# Patient Record
Sex: FEMALE | Race: WHITE | NOT HISPANIC OR LATINO | URBAN - METROPOLITAN AREA
[De-identification: names, ages, dates, MRNs, and addresses within clinical notes are randomized per-mention and may not be internally consistent; named-entity substitution may affect disease eponyms.]

---

## 2020-06-03 ENCOUNTER — WEB ENCOUNTER (OUTPATIENT)
Dept: URBAN - METROPOLITAN AREA CLINIC 118 | Facility: CLINIC | Age: 55
End: 2020-06-03

## 2021-02-01 ENCOUNTER — LAB OUTSIDE AN ENCOUNTER (OUTPATIENT)
Dept: URBAN - METROPOLITAN AREA CLINIC 118 | Facility: CLINIC | Age: 56
End: 2021-02-01

## 2021-02-01 ENCOUNTER — OFFICE VISIT (OUTPATIENT)
Dept: URBAN - METROPOLITAN AREA CLINIC 118 | Facility: CLINIC | Age: 56
End: 2021-02-01
Payer: COMMERCIAL

## 2021-02-01 DIAGNOSIS — R13.19 CERVICAL DYSPHAGIA: ICD-10-CM

## 2021-02-01 DIAGNOSIS — K21.9 GASTROESOPHAGEAL REFLUX DISEASE, UNSPECIFIED WHETHER ESOPHAGITIS PRESENT: ICD-10-CM

## 2021-02-01 PROCEDURE — 99213 OFFICE O/P EST LOW 20 MIN: CPT | Performed by: INTERNAL MEDICINE

## 2021-02-01 PROCEDURE — 3017F COLORECTAL CA SCREEN DOC REV: CPT | Performed by: INTERNAL MEDICINE

## 2021-02-01 PROCEDURE — G9903 PT SCRN TBCO ID AS NON USER: HCPCS | Performed by: INTERNAL MEDICINE

## 2021-02-01 PROCEDURE — 1036F TOBACCO NON-USER: CPT | Performed by: INTERNAL MEDICINE

## 2021-02-01 PROCEDURE — G8427 DOCREV CUR MEDS BY ELIG CLIN: HCPCS | Performed by: INTERNAL MEDICINE

## 2021-02-01 PROCEDURE — G8482 FLU IMMUNIZE ORDER/ADMIN: HCPCS | Performed by: INTERNAL MEDICINE

## 2021-02-01 PROCEDURE — G8417 CALC BMI ABV UP PARAM F/U: HCPCS | Performed by: INTERNAL MEDICINE

## 2021-02-01 RX ORDER — PANTOPRAZOLE SODIUM 20 MG/1
1 TABLET TABLET, DELAYED RELEASE ORAL ONCE A DAY
Qty: 30 | Refills: 0 | OUTPATIENT
Start: 2021-02-01

## 2021-02-01 NOTE — PHYSICAL EXAM GASTROINTESTINAL
Abdomen,  soft, nontender, nondistended,  no guarding or rigidity,  no masses palpable,  normal bowel sounds,  Liver and Spleen, no hepatosplenomegaly

## 2021-02-01 NOTE — PHYSICAL EXAM CONSTITUTIONAL:
normal,  alert,  in no acute distress,  well developed, well nourished,  ambulating without difficulty,  normal communication ability

## 2021-02-01 NOTE — HPI-TODAY'S VISIT:
Pt presents for evaluation of dysphagia x3-4 years. She reports dyshagia to solids only, worse with meats and vitamin capsules. She reports sx are oropharyngeal. Assoc sx include "air bubbles" in esophagus, belching after eating 2 hours before bedtime, worse with laying down. She notes occ vomiting to relieve dysphagia. Also reports regurgitation, bolus sensation, clears throat frequently, occ coughing. She also complains of LUQ "sharp" pain 1-2 hours after food, usually relieved with BMs. She has tried dietary modifications and has cut out "trigger" foods (meats, french fries, tomatoes). She admits to 400 mg Advil q1-2 days for stiff neck and headaches. She denies odynophagia, heartburn, incontinence, wt loss, nausea, GI blood loss, epigastric pain. BMs 1-2x/day with soft, formed stool with fiber and smoothies. Last colonoscopy 7/1/2019. Pt due in 2024.

## 2021-02-12 ENCOUNTER — OFFICE VISIT (OUTPATIENT)
Dept: URBAN - METROPOLITAN AREA SURGERY CENTER 23 | Facility: SURGERY CENTER | Age: 56
End: 2021-02-12

## 2021-03-01 ENCOUNTER — OFFICE VISIT (OUTPATIENT)
Dept: URBAN - METROPOLITAN AREA SURGERY CENTER 23 | Facility: SURGERY CENTER | Age: 56
End: 2021-03-01

## 2021-03-01 ENCOUNTER — ERX REFILL RESPONSE (OUTPATIENT)
Dept: URBAN - METROPOLITAN AREA CLINIC 118 | Facility: CLINIC | Age: 56
End: 2021-03-01

## 2021-03-01 RX ORDER — PANTOPRAZOLE 20 MG/1
1 TABLET TABLET, DELAYED RELEASE ORAL ONCE A DAY
Qty: 30 | Refills: 0

## 2021-03-30 ENCOUNTER — ERX REFILL RESPONSE (OUTPATIENT)
Dept: URBAN - METROPOLITAN AREA CLINIC 118 | Facility: CLINIC | Age: 56
End: 2021-03-30

## 2021-03-30 RX ORDER — PANTOPRAZOLE 20 MG/1
1 TABLET TABLET, DELAYED RELEASE ORAL ONCE A DAY
Qty: 90 | Refills: 0

## 2021-03-31 ENCOUNTER — ERX REFILL RESPONSE (OUTPATIENT)
Dept: URBAN - METROPOLITAN AREA CLINIC 118 | Facility: CLINIC | Age: 56
End: 2021-03-31

## 2021-03-31 RX ORDER — PANTOPRAZOLE 20 MG/1
1 TABLET TABLET, DELAYED RELEASE ORAL ONCE A DAY
Qty: 30 | Refills: 0

## 2021-04-14 PROBLEM — 40739000: Status: ACTIVE | Noted: 2021-02-01

## 2021-04-19 ENCOUNTER — OFFICE VISIT (OUTPATIENT)
Dept: URBAN - METROPOLITAN AREA SURGERY CENTER 23 | Facility: SURGERY CENTER | Age: 56
End: 2021-04-19
Payer: COMMERCIAL

## 2021-04-19 DIAGNOSIS — K22.2 ACQUIRED ESOPHAGEAL RING: ICD-10-CM

## 2021-04-19 DIAGNOSIS — K21.00 ALKALINE REFLUX ESOPHAGITIS: ICD-10-CM

## 2021-04-19 PROCEDURE — 43239 EGD BIOPSY SINGLE/MULTIPLE: CPT | Performed by: INTERNAL MEDICINE

## 2021-04-19 PROCEDURE — G8907 PT DOC NO EVENTS ON DISCHARG: HCPCS | Performed by: INTERNAL MEDICINE

## 2021-04-19 PROCEDURE — 43248 EGD GUIDE WIRE INSERTION: CPT | Performed by: INTERNAL MEDICINE

## 2021-04-19 RX ORDER — PANTOPRAZOLE 20 MG/1
1 TABLET TABLET, DELAYED RELEASE ORAL ONCE A DAY
Qty: 30 | Refills: 0 | Status: ACTIVE | COMMUNITY

## 2021-07-28 ENCOUNTER — ERX REFILL RESPONSE (OUTPATIENT)
Dept: URBAN - METROPOLITAN AREA CLINIC 118 | Facility: CLINIC | Age: 56
End: 2021-07-28

## 2021-07-28 RX ORDER — PANTOPRAZOLE 20 MG/1
1 TABLET TABLET, DELAYED RELEASE ORAL ONCE A DAY
Qty: 30 | Refills: 0 | OUTPATIENT

## 2021-07-28 RX ORDER — PANTOPRAZOLE 20 MG/1
TAKE ONE TABLET BY MOUTH ONE TIME DAILY TABLET, DELAYED RELEASE ORAL
Qty: 90 TABLET | Refills: 1 | OUTPATIENT

## 2022-02-02 ENCOUNTER — OFFICE VISIT (OUTPATIENT)
Dept: URBAN - METROPOLITAN AREA CLINIC 118 | Facility: CLINIC | Age: 57
End: 2022-02-02
Payer: COMMERCIAL

## 2022-02-02 ENCOUNTER — LAB OUTSIDE AN ENCOUNTER (OUTPATIENT)
Dept: URBAN - METROPOLITAN AREA CLINIC 118 | Facility: CLINIC | Age: 57
End: 2022-02-02

## 2022-02-02 DIAGNOSIS — K43.9 VENTRAL HERNIA WITHOUT OBSTRUCTION OR GANGRENE: ICD-10-CM

## 2022-02-02 DIAGNOSIS — K21.00 GASTROESOPHAGEAL REFLUX DISEASE WITH ESOPHAGITIS WITHOUT HEMORRHAGE: ICD-10-CM

## 2022-02-02 DIAGNOSIS — R13.19 ESOPHAGEAL DYSPHAGIA: ICD-10-CM

## 2022-02-02 DIAGNOSIS — R19.06 EPIGASTRIC MASS: ICD-10-CM

## 2022-02-02 PROCEDURE — 99214 OFFICE O/P EST MOD 30 MIN: CPT | Performed by: INTERNAL MEDICINE

## 2022-02-02 RX ORDER — PANTOPRAZOLE 20 MG/1
TAKE ONE TABLET BY MOUTH ONE TIME DAILY TABLET, DELAYED RELEASE ORAL
Qty: 90 TABLET | Refills: 1 | Status: DISCONTINUED | COMMUNITY

## 2022-02-02 RX ORDER — FAMOTIDINE 40 MG/1
1 TABLET AT BEDTIME TABLET, FILM COATED ORAL ONCE A DAY
Qty: 30 TABLET | Refills: 5 | OUTPATIENT
Start: 2022-02-02

## 2022-02-02 RX ORDER — CALCIUM CARBONATE 500 MG/1
1 TABLET TABLET ORAL ONCE A DAY
Status: ACTIVE | COMMUNITY

## 2022-02-02 RX ORDER — MV-MIN/FOLIC/VIT K/LUT/HERB293 240-120MCG
AS DIRECTED TABLET ORAL
Status: ACTIVE | COMMUNITY

## 2022-02-02 NOTE — HPI-TODAY'S VISIT:
The patient is following up after an upper endoscopy last year for dysphagia.  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  This was dilated using a 17 mm savory device.  There was a good post endoscopic appearance.  Since then she has had no severe attacks of dysphagia but still has problems with large pills and meat.  She has no hematemesis, melena, acute food impaction, or odynophagia.  She did stop her Protonix because it made her feel more bloated and discomfort.  She has been using herbal treatments for her reflux as well as Tums every morning.  She also has a complaint of a ventral hernia that was initially noticed 10 years ago but has worsened significantly in the last 2 years.  Is become more painful and bulges from the navel to the xiphoid process.  She had Covid last year and thinks it may have gotten worse with her coughing from that.  She has had a total hysterectomy as well as an exploratory laparoscopy for endometriosis is a young woman.  There is been no acute change in her bowel habits or blood in her stools.  She has had no upper abdominal surgery.  Of note she has gained 20 pounds in the past 5 years.

## 2022-02-02 NOTE — PHYSICAL EXAM GASTROINTESTINAL
Abdomen , soft, tender at moderate sized ventral hernia without gross entrapment , no guarding or rigidity , no masses palpable , normal bowel sounds , Liver and Spleen , no hepatomegaly present , no hepatosplenomegaly , liver nontender , spleen not palpable

## 2022-05-11 ENCOUNTER — OFFICE VISIT (OUTPATIENT)
Dept: URBAN - METROPOLITAN AREA CLINIC 118 | Facility: CLINIC | Age: 57
End: 2022-05-11
Payer: COMMERCIAL

## 2022-05-11 DIAGNOSIS — K21.00 GASTROESOPHAGEAL REFLUX DISEASE WITH ESOPHAGITIS WITHOUT HEMORRHAGE: ICD-10-CM

## 2022-05-11 DIAGNOSIS — K43.9 VENTRAL HERNIA WITHOUT OBSTRUCTION OR GANGRENE: ICD-10-CM

## 2022-05-11 DIAGNOSIS — E66.9 OBESITY (BMI 30.0-34.9): ICD-10-CM

## 2022-05-11 PROBLEM — 443371000124107: Status: ACTIVE | Noted: 2022-05-11

## 2022-05-11 PROBLEM — 266433003: Status: ACTIVE | Noted: 2022-02-02

## 2022-05-11 PROCEDURE — 99214 OFFICE O/P EST MOD 30 MIN: CPT | Performed by: INTERNAL MEDICINE

## 2022-05-11 RX ORDER — MAGNESIUM GLYCINATE 100 MG
AS DIRECTED CAPSULE ORAL
Status: ACTIVE | COMMUNITY

## 2022-05-11 RX ORDER — FAMOTIDINE 40 MG/1
1 TABLET AT BEDTIME TABLET, FILM COATED ORAL ONCE A DAY
Qty: 30 TABLET | Refills: 5 | Status: DISCONTINUED | COMMUNITY
Start: 2022-02-02

## 2022-05-11 RX ORDER — MV-MIN/FOLIC/VIT K/LUT/HERB293 240-120MCG
AS DIRECTED TABLET ORAL
Status: ACTIVE | COMMUNITY

## 2022-05-11 RX ORDER — CALCIUM CARBONATE 500 MG/1
1 TABLET TABLET ORAL ONCE A DAY
Status: DISCONTINUED | COMMUNITY

## 2022-05-11 RX ORDER — ESTRADIOL 0.05 MG/D
1 PATCH TO SKIN PATCH TRANSDERMAL
Status: ACTIVE | COMMUNITY

## 2022-05-11 NOTE — HPI-TODAY'S VISIT:
The patient is following up after her recent CT scan that confirmed a ventral hernia.  There was fat contained within the hernia but no evidence of bowel and no evidence of obstruction.  The hernia is not painful and her bowel movements are relatively regular without blood or straining.  She has no nausea, vomiting, or abnormal loss of weight.  She is not currently using a support device.  Her weight is stable since her last visit.  She did start the famotidine that we prescribed last visit for her mild reflux disease.  She only uses this once a week or less.  She thinks she is improved with elevation of her bad as well as changing her diet to increase fiber and lower meat and cheese consumption.  She has no significant dysphagia, odynophagia, hematemesis, or melena.

## 2024-04-02 ENCOUNTER — OV EP (OUTPATIENT)
Dept: URBAN - METROPOLITAN AREA CLINIC 118 | Facility: CLINIC | Age: 59
End: 2024-04-02

## 2024-04-04 ENCOUNTER — OV EP (OUTPATIENT)
Dept: URBAN - METROPOLITAN AREA CLINIC 118 | Facility: CLINIC | Age: 59
End: 2024-04-04
Payer: COMMERCIAL

## 2024-04-04 VITALS
HEIGHT: 67 IN | DIASTOLIC BLOOD PRESSURE: 73 MMHG | BODY MASS INDEX: 33.49 KG/M2 | HEART RATE: 84 BPM | SYSTOLIC BLOOD PRESSURE: 127 MMHG | TEMPERATURE: 97.9 F | WEIGHT: 213.4 LBS

## 2024-04-04 DIAGNOSIS — Z86.010 PERSONAL HISTORY OF COLONIC POLYPS: ICD-10-CM

## 2024-04-04 DIAGNOSIS — K43.9 VENTRAL HERNIA WITHOUT OBSTRUCTION OR GANGRENE: ICD-10-CM

## 2024-04-04 DIAGNOSIS — E66.9 OBESITY (BMI 30-39.9): ICD-10-CM

## 2024-04-04 DIAGNOSIS — K21.00 GASTROESOPHAGEAL REFLUX DISEASE WITH ESOPHAGITIS WITHOUT HEMORRHAGE: ICD-10-CM

## 2024-04-04 PROBLEM — 428283002: Status: ACTIVE | Noted: 2024-04-04

## 2024-04-04 PROCEDURE — 99214 OFFICE O/P EST MOD 30 MIN: CPT | Performed by: INTERNAL MEDICINE

## 2024-04-04 RX ORDER — MAGNESIUM GLYCINATE 100 MG
AS DIRECTED CAPSULE ORAL
Status: ACTIVE | COMMUNITY

## 2024-04-04 RX ORDER — ESTRADIOL 0.05 MG/D
1 PATCH TO SKIN PATCH TRANSDERMAL
Status: ACTIVE | COMMUNITY

## 2024-04-04 RX ORDER — MV-MIN/FOLIC/VIT K/LUT/HERB293 240-120MCG
AS DIRECTED TABLET ORAL
Status: ACTIVE | COMMUNITY

## 2024-04-04 NOTE — HPI-TODAY'S VISIT:
The patient was last seen 2 years ago.  Since that time she has not lost any weight but her reflux disease remains well-controlled off of medications.  She has no dysphagia, odynophagia, hematemesis, or melena.  She denies any severe substernal burning.  She is controlling with dietary and lifestyle changes.  Her weight is stable.  Her ventral hernia is no larger than it was in 2022.  She is due for a colonoscopy this year.  She has a history of a tubular adenoma in 2000 cardiac disease.  She does have a long history of reactive airway disease but it is very mild and she has not used albuterol in several months; there is been no emergency room visits or steroid use in the last year

## 2024-04-04 NOTE — PHYSICAL EXAM GASTROINTESTINAL
Abdomen , soft, nontender, ventral hernia without tenderness, no guarding or rigidity , no masses palpable , normal bowel sounds , Liver and Spleen , no hepatomegaly present , no hepatosplenomegaly , liver nontender , spleen not palpable

## 2024-04-09 ENCOUNTER — OV EP (OUTPATIENT)
Dept: URBAN - METROPOLITAN AREA CLINIC 118 | Facility: CLINIC | Age: 59
End: 2024-04-09

## 2024-04-11 ENCOUNTER — LAB (OUTPATIENT)
Dept: URBAN - METROPOLITAN AREA CLINIC 118 | Facility: CLINIC | Age: 59
End: 2024-04-11

## 2024-05-15 ENCOUNTER — CLAIMS CREATED FROM THE CLAIM WINDOW (OUTPATIENT)
Dept: URBAN - METROPOLITAN AREA SURGERY CENTER 23 | Facility: SURGERY CENTER | Age: 59
End: 2024-05-15
Payer: COMMERCIAL

## 2024-05-15 DIAGNOSIS — Z86.010 ADENOMAS PERSONAL HISTORY OF COLONIC POLYPS: ICD-10-CM

## 2024-05-15 DIAGNOSIS — Z12.11 COLON CANCER SCREENING: ICD-10-CM

## 2024-05-15 PROCEDURE — G0105 COLORECTAL SCRN; HI RISK IND: HCPCS | Performed by: INTERNAL MEDICINE

## 2024-05-15 PROCEDURE — G8907 PT DOC NO EVENTS ON DISCHARG: HCPCS | Performed by: INTERNAL MEDICINE

## 2024-05-15 PROCEDURE — 00812 ANES LWR INTST SCR COLSC: CPT | Performed by: NURSE ANESTHETIST, CERTIFIED REGISTERED

## 2024-05-15 RX ORDER — MAGNESIUM GLYCINATE 100 MG
AS DIRECTED CAPSULE ORAL
Status: ACTIVE | COMMUNITY

## 2024-05-15 RX ORDER — MV-MIN/FOLIC/VIT K/LUT/HERB293 240-120MCG
AS DIRECTED TABLET ORAL
Status: ACTIVE | COMMUNITY

## 2024-05-15 RX ORDER — ESTRADIOL 0.05 MG/D
1 PATCH TO SKIN PATCH TRANSDERMAL
Status: ACTIVE | COMMUNITY